# Patient Record
Sex: MALE | Race: BLACK OR AFRICAN AMERICAN | NOT HISPANIC OR LATINO | Employment: FULL TIME | URBAN - METROPOLITAN AREA
[De-identification: names, ages, dates, MRNs, and addresses within clinical notes are randomized per-mention and may not be internally consistent; named-entity substitution may affect disease eponyms.]

---

## 2022-02-17 ENCOUNTER — PATIENT ROUNDING (BHMG ONLY) (OUTPATIENT)
Dept: FAMILY MEDICINE CLINIC | Facility: CLINIC | Age: 47
End: 2022-02-17

## 2022-02-17 ENCOUNTER — OFFICE VISIT (OUTPATIENT)
Dept: FAMILY MEDICINE CLINIC | Facility: CLINIC | Age: 47
End: 2022-02-17

## 2022-02-17 VITALS
DIASTOLIC BLOOD PRESSURE: 88 MMHG | HEART RATE: 96 BPM | OXYGEN SATURATION: 99 % | HEIGHT: 66 IN | SYSTOLIC BLOOD PRESSURE: 126 MMHG | BODY MASS INDEX: 32.88 KG/M2 | WEIGHT: 204.6 LBS

## 2022-02-17 DIAGNOSIS — K21.9 GASTROESOPHAGEAL REFLUX DISEASE WITHOUT ESOPHAGITIS: ICD-10-CM

## 2022-02-17 DIAGNOSIS — F41.8 DEPRESSION WITH ANXIETY: Primary | ICD-10-CM

## 2022-02-17 DIAGNOSIS — R07.89 OTHER CHEST PAIN: ICD-10-CM

## 2022-02-17 DIAGNOSIS — S29.011A PECTORALIS MUSCLE STRAIN, INITIAL ENCOUNTER: ICD-10-CM

## 2022-02-17 PROCEDURE — 99204 OFFICE O/P NEW MOD 45 MIN: CPT | Performed by: INTERNAL MEDICINE

## 2022-02-17 RX ORDER — ESCITALOPRAM OXALATE 10 MG/1
10 TABLET ORAL DAILY
Qty: 30 TABLET | Refills: 9 | Status: SHIPPED | OUTPATIENT
Start: 2022-02-17

## 2022-02-17 RX ORDER — FAMOTIDINE 40 MG/1
40 TABLET, FILM COATED ORAL DAILY
Qty: 30 TABLET | Refills: 9 | Status: SHIPPED | OUTPATIENT
Start: 2022-02-17

## 2022-02-17 RX ORDER — OMEPRAZOLE 40 MG/1
CAPSULE, DELAYED RELEASE ORAL
COMMUNITY
End: 2022-02-17

## 2022-02-17 NOTE — PATIENT INSTRUCTIONS
Pectoralis Major Tear Rehab  Ask your health care provider which exercises are safe for you. Do exercises exactly as told by your health care provider and adjust them as directed. It is normal to feel mild stretching, pulling, tightness, or discomfort as you do these exercises. Stop right away if you feel sudden pain or your pain gets worse. Do not begin these exercises until told by your health care provider.  Stretching and range-of-motion exercises  These exercises warm up your muscles and joints and improve the movement and flexibility of your shoulder. These exercises can also help to relieve pain, numbness, and tingling.  Pendulum  This is a shoulder exercise in which you let the injured arm dangle toward the floor and then swing it like a clock pendulum.  1. Stand near a wall or a surface that you can hold onto for balance.  2. Bend at the waist and let your left / right arm hang straight down. Use your other arm to keep your balance.  3. Relax your arm and shoulder muscles, and move your hips and your trunk so your left / right arm swings freely. Your arm should swing because of the motion of your body, not because you are using your arm or shoulder muscles.  4. Keep moving your hips and trunk so your arm swings in the following directions, as told by your health care provider:  ? Side to side.  ? Forward and backward.  ? In clockwise and counterclockwise circles.  5. Slowly return to the starting position.  Repeat __________ times. Complete this exercise __________ times a day.  Standing shoulder abduction, passive  In this exercise, the injured shoulder relaxes (passive) while you use the healthy arm to push it away from your body (abduction).  1. Stand and hold a broomstick, a cane, or a similar object. Place your hands a little more than shoulder width apart on the object. Your left / right hand should be palm-up, and your other hand should be palm-down.  2. While keeping your elbow straight and your  shoulder muscles relaxed, push the stick across your body toward your left / right side. Raise your left / right arm to the side of your body and then over your head until you feel a stretch in your shoulder.  ? Stop when you reach the angle that is recommended by your health care provider.  ? Avoid shrugging your shoulder while you raise your arm. Keep your shoulder blade tucked down toward the middle of your spine.  3. Hold for __________ seconds.  4. Slowly return to the starting position.  Repeat __________ times. Complete this exercise __________ times a day.  Supine wand shoulder flexion, passive  In this exercise, the injured shoulder relaxes (passive) while you use the healthy arm to move it (flexion).  1. Lie on your back (supine position). You may bend your knees for comfort.  2. Hold a broomstick, a cane, or a similar object so that your hands are about shoulder width apart on the object. Your palms should face toward your feet.  3. Raise your left / right arm in front of your face, then behind your head (toward the floor). Use your other hand to help you do this. Stop when you feel a gentle stretch in your shoulder, or when you reach the angle that is recommended by your health care provider.  4. Hold for __________ seconds.  5. Use the stick and your other arm to help you return your left / right arm to the starting position.  Repeat __________ times. Complete this exercise __________ times a day.  Wand shoulder external rotation, passive  In this exercise, the injured shoulder relaxes (passive) while you use the healthy arm to push it away to your side (external rotation).  1. Stand and hold a broomstick, a cane, or a similar object so your hands are about shoulder width apart on the object.  2. Start with your arms hanging down, then bend both elbows to a 90-degree angle (right angle).  3. Keep your left / right elbow at your side. Use your other hand to push the stick so your left / right forearm  moves away from your body, out to your side.  ? Keep your left / right elbow bent to 90 degrees and keep it against your side.  ? Stop when you feel a gentle stretch in your shoulder, or when you reach the angle recommended by your health care provider.  4. Hold for __________ seconds.  5. Use the stick to help you return your left / right arm to the starting position.  Repeat __________ times. Complete this exercise __________ times a day.  Strengthening exercises  These exercises build strength and endurance in your shoulder. Endurance is the ability to use your muscles for a long time, even after your muscles get tired.  Scapular protraction, standing    1. Stand so you are facing a wall. Place your feet about one arm-length away from the wall.  2. Place your hands on the wall and straighten your elbows.  3. Keep your hands on the wall as you push your upper back away from the wall. You should feel your shoulder blades (scapulae) sliding forward (protraction) around your rib cage. Keep your elbows and your head still.  ? If you are not sure that you are doing this exercise correctly, ask your health care provider for more instructions.  4. Hold for __________ seconds.  5. Slowly return to the starting position. Let your muscles relax completely before you repeat this exercise.  Repeat __________ times. Complete this exercise __________ times a day.  Scapular retraction, seated  This exercise is also called shoulder blade squeezes.  1. Sit with good posture in a stable chair without armrests. Do not let your back touch the back of the chair.  2. Your arms should be at your sides with your elbows bent. You may rest your forearms on a pillow if that is more comfortable.  3. Squeeze your shoulder blades (scapulae) together. Bring them down and back (retraction).  ? Keep your shoulders level.  ? Do not lift your shoulders up toward your ears.  4. Hold for __________ seconds.  5. Return to the starting  position.  Repeat __________ times. Complete this exercise __________ times a day.  This information is not intended to replace advice given to you by your health care provider. Make sure you discuss any questions you have with your health care provider.  Document Revised: 04/09/2020 Document Reviewed: 12/16/2019  Elsevier Patient Education © 2021 [employer]

## 2022-02-17 NOTE — ASSESSMENT & PLAN NOTE
Tylenol as needed.  Ice 20 minutes on 20 minutes off exercises provided.  If symptoms persist, physical therapy referral

## 2022-02-17 NOTE — ASSESSMENT & PLAN NOTE
Patient's depression is single episode and is mild without psychosis. Their depression is currently active and the condition is newly identified. This will be reassessed at the next regular appointment. F/U as described:patient was prescribed an antidepressant medicine.  Lexapro 10 mg daily.  Side effects discussed.  Refer to behavioral health specialist.

## 2022-02-17 NOTE — PROGRESS NOTES
River Crowell  1975  [id number]  Patient Care Team:  Dallas Parra MD as PCP - General (Internal Medicine)    River Crowell is a 46 y.o. male here today to establish care.  This patient is accompanied by their self who contributes to the history of their care.    Chief Complaint:    Chief Complaint   Patient presents with   • Establish Care   • Chest Pain     Lt side pain, Muscle pain   • Anxiety   • Depression         History of Present Illness:    new, reports one month hx chest discomfort 1-2 x per week. Typically at rest. Feels cramping/pressure in left anterior chest. Typically can last 3 hrs. Eases with tylenol. Pain does not radiate. Not associated with SOA.  Triggers include chain smoking/or beer drinking. Reports daily heartburn. Stopped taking prilosec, but he stopped this 2/2 to apnea. Eats tums with improvement. Has not tried tums with chest pain. ( had egd and colonoscopy 3 years ago- he thinks [facility]). There is no associated nausea, no  Vomiting.     Smokes 1/2ppd. Unaware of his lipid status.    Struggling with confidence, felling down. Denies HI/SI    Past Medical History:   Diagnosis Date   • Anxiety    • Depression        Past Surgical History:   Procedure Laterality Date   • ANKLE SURGERY     • HERNIA REPAIR          Family History   Problem Relation Age of Onset   • Hypertension Mother    • Diabetes Mother    • Anxiety disorder Father    • Depression Father    • Colon cancer Father    • Diabetes Father    • Other Father         parkinson   • No Known Problems Brother    • No Known Problems Maternal Grandmother    • Colon cancer Maternal Grandfather         nph   • Colon cancer Paternal Grandfather        Social History     Socioeconomic History   • Marital status:    Tobacco Use   • Smoking status: Current Every Day Smoker     Packs/day: 0.50     Types: Cigarettes     Start date: 1994   • Smokeless tobacco: Never Used   Vaping Use   • Vaping Use: Never used  "  Substance and Sexual Activity   • Alcohol use: Yes     Comment: social   • Drug use: Never   • Sexual activity: Defer       Allergies   Allergen Reactions   • Morphine Other (See Comments)       Review of Systems:    Review of Systems   Constitutional: Positive for fatigue. Negative for unexpected weight gain and unexpected weight loss.   HENT:        Snore   Eyes: Negative.    Respiratory: Negative for shortness of breath and wheezing.    Cardiovascular: Positive for chest pain and palpitations.        Palpitations awakening him, nightly   Gastrointestinal: Positive for blood in stool, GERD and indigestion. Negative for nausea and vomiting.        Occasional blood in stool.    Endocrine: Negative for cold intolerance and heat intolerance.   Genitourinary: Negative.    Musculoskeletal: Negative.    Skin: Negative.    Neurological: Negative.    Psychiatric/Behavioral: Positive for sleep disturbance and depressed mood. The patient is nervous/anxious.        Vitals:    02/17/22 1321   BP: 126/88   Pulse: 96   SpO2: 99%   Weight: 92.8 kg (204 lb 9.6 oz)   Height: 167.6 cm (66\")   PainSc:   6   PainLoc: Chest     Body mass index is 33.02 kg/m².      Current Outpatient Medications:   •  escitalopram (Lexapro) 10 MG tablet, Take 1 tablet by mouth Daily., Disp: 30 tablet, Rfl: 9  •  famotidine (Pepcid) 40 MG tablet, Take 1 tablet by mouth Daily., Disp: 30 tablet, Rfl: 9    Physical Exam:    Physical Exam  Vitals and nursing note reviewed.   Constitutional:       General: He is not in acute distress.     Appearance: He is well-developed. He is not diaphoretic.   HENT:      Head: Normocephalic and atraumatic.      Right Ear: Tympanic membrane and external ear normal.      Left Ear: Tympanic membrane and external ear normal.      Mouth/Throat:      Pharynx: No oropharyngeal exudate.   Eyes:      General: No scleral icterus.        Right eye: No discharge.         Left eye: No discharge.      Extraocular Movements: " Extraocular movements intact.      Conjunctiva/sclera: Conjunctivae normal.      Pupils: Pupils are equal, round, and reactive to light.   Neck:      Thyroid: No thyromegaly.      Vascular: No JVD.      Trachea: No tracheal deviation.   Cardiovascular:      Rate and Rhythm: Normal rate and regular rhythm.      Pulses: Normal pulses.      Heart sounds: Normal heart sounds. No murmur heard.       Comments: PMI nondisplaced.  He has reproducible pectoral tenderness which mimics his symptoms.  Pulmonary:      Effort: Pulmonary effort is normal.      Breath sounds: Normal breath sounds. No wheezing or rales.   Abdominal:      General: Bowel sounds are normal.      Palpations: Abdomen is soft.      Tenderness: There is no abdominal tenderness. There is no guarding or rebound.   Musculoskeletal:      Cervical back: Normal range of motion and neck supple.      Comments: Normal gait   Lymphadenopathy:      Cervical: No cervical adenopathy.   Skin:     General: Skin is warm and dry.      Capillary Refill: Capillary refill takes less than 2 seconds.      Coloration: Skin is not pale.      Findings: No rash.   Neurological:      Mental Status: He is alert and oriented to person, place, and time.      Motor: No abnormal muscle tone.      Coordination: Coordination normal.   Psychiatric:         Mood and Affect: Mood normal.         Behavior: Behavior normal.         Judgment: Judgment normal.         Procedures    Results Review:    None    Assessment/Plan:     Problem List Items Addressed This Visit        Cardiac and Vasculature    Other chest pain       Gastrointestinal Abdominal     Gastroesophageal reflux disease without esophagitis    Current Assessment & Plan         Modifications addressed, avoid omeprazole with history of apnea while on this.  Recommended Pepcid 40 mg daily.         Relevant Medications    famotidine (Pepcid) 40 MG tablet       Mental Health    Depression with anxiety - Primary    Current Assessment &  Plan     Patient's depression is single episode and is mild without psychosis. Their depression is currently active and the condition is newly identified. This will be reassessed at the next regular appointment. F/U as described:patient was prescribed an antidepressant medicine.  Lexapro 10 mg daily.  Side effects discussed.  Refer to behavioral health specialist.         Relevant Medications    escitalopram (Lexapro) 10 MG tablet    Other Relevant Orders    Ambulatory Referral to Behavioral Health       Musculoskeletal and Injuries    Pectoralis muscle strain    Current Assessment & Plan     Tylenol as needed.  Ice 20 minutes on 20 minutes off exercises provided.  If symptoms persist, physical therapy referral               Plan of care reviewed with patient at the conclusion of today's visit. Education was provided regarding diagnosis and management.  Patient verbalizes understanding of and agreement with management plan.    Return in about 6 weeks (around 3/31/2022) for Annual.    Dallas Parra MD      Please note than portions of this note were completed wt a Voice Recognition Program

## 2022-02-25 ENCOUNTER — OFFICE VISIT (OUTPATIENT)
Dept: FAMILY MEDICINE CLINIC | Facility: CLINIC | Age: 47
End: 2022-02-25

## 2022-02-25 ENCOUNTER — TELEPHONE (OUTPATIENT)
Dept: FAMILY MEDICINE CLINIC | Facility: CLINIC | Age: 47
End: 2022-02-25

## 2022-02-25 ENCOUNTER — LAB (OUTPATIENT)
Dept: LAB | Facility: HOSPITAL | Age: 47
End: 2022-02-25

## 2022-02-25 VITALS
HEART RATE: 77 BPM | HEIGHT: 66 IN | SYSTOLIC BLOOD PRESSURE: 141 MMHG | BODY MASS INDEX: 32.92 KG/M2 | DIASTOLIC BLOOD PRESSURE: 90 MMHG | TEMPERATURE: 97.5 F | WEIGHT: 204.8 LBS | OXYGEN SATURATION: 98 %

## 2022-02-25 DIAGNOSIS — Z11.59 ENCOUNTER FOR HEPATITIS C SCREENING TEST FOR LOW RISK PATIENT: ICD-10-CM

## 2022-02-25 DIAGNOSIS — Z13.29 THYROID DISORDER SCREENING: ICD-10-CM

## 2022-02-25 DIAGNOSIS — F41.8 DEPRESSION WITH ANXIETY: ICD-10-CM

## 2022-02-25 DIAGNOSIS — E78.5 DYSLIPIDEMIA: Primary | ICD-10-CM

## 2022-02-25 DIAGNOSIS — Z23 IMMUNIZATION DUE: ICD-10-CM

## 2022-02-25 DIAGNOSIS — Z00.00 ANNUAL PHYSICAL EXAM: ICD-10-CM

## 2022-02-25 DIAGNOSIS — R07.89 OTHER CHEST PAIN: Primary | ICD-10-CM

## 2022-02-25 DIAGNOSIS — D72.829 LEUKOCYTOSIS, UNSPECIFIED TYPE: ICD-10-CM

## 2022-02-25 DIAGNOSIS — S29.011A PECTORALIS MUSCLE STRAIN, INITIAL ENCOUNTER: ICD-10-CM

## 2022-02-25 DIAGNOSIS — K21.9 GASTROESOPHAGEAL REFLUX DISEASE WITHOUT ESOPHAGITIS: ICD-10-CM

## 2022-02-25 LAB
ALBUMIN SERPL-MCNC: 4.7 G/DL (ref 3.5–5.2)
ALBUMIN/GLOB SERPL: 1.7 G/DL
ALP SERPL-CCNC: 63 U/L (ref 39–117)
ALT SERPL W P-5'-P-CCNC: 35 U/L (ref 1–41)
ANION GAP SERPL CALCULATED.3IONS-SCNC: 9.9 MMOL/L (ref 5–15)
AST SERPL-CCNC: 20 U/L (ref 1–40)
BASOPHILS # BLD AUTO: 0.03 10*3/MM3 (ref 0–0.2)
BASOPHILS NFR BLD AUTO: 0.3 % (ref 0–1.5)
BILIRUB SERPL-MCNC: 0.4 MG/DL (ref 0–1.2)
BUN SERPL-MCNC: 14 MG/DL (ref 6–20)
BUN/CREAT SERPL: 12.4 (ref 7–25)
CALCIUM SPEC-SCNC: 9.7 MG/DL (ref 8.6–10.5)
CHLORIDE SERPL-SCNC: 105 MMOL/L (ref 98–107)
CHOLEST SERPL-MCNC: 242 MG/DL (ref 0–200)
CO2 SERPL-SCNC: 25.1 MMOL/L (ref 22–29)
CREAT SERPL-MCNC: 1.13 MG/DL (ref 0.76–1.27)
DEPRECATED RDW RBC AUTO: 42.8 FL (ref 37–54)
EOSINOPHIL # BLD AUTO: 0.18 10*3/MM3 (ref 0–0.4)
EOSINOPHIL NFR BLD AUTO: 1.6 % (ref 0.3–6.2)
ERYTHROCYTE [DISTWIDTH] IN BLOOD BY AUTOMATED COUNT: 17.4 % (ref 12.3–15.4)
GFR SERPL CREATININE-BSD FRML MDRD: 85 ML/MIN/1.73
GLOBULIN UR ELPH-MCNC: 2.7 GM/DL
GLUCOSE SERPL-MCNC: 87 MG/DL (ref 65–99)
HCT VFR BLD AUTO: 42 % (ref 37.5–51)
HCV AB SER DONR QL: NORMAL
HDLC SERPL-MCNC: 40 MG/DL (ref 40–60)
HGB BLD-MCNC: 13.4 G/DL (ref 13–17.7)
IMM GRANULOCYTES # BLD AUTO: 0.05 10*3/MM3 (ref 0–0.05)
IMM GRANULOCYTES NFR BLD AUTO: 0.4 % (ref 0–0.5)
LDLC SERPL CALC-MCNC: 186 MG/DL (ref 0–100)
LDLC/HDLC SERPL: 4.59 {RATIO}
LYMPHOCYTES # BLD AUTO: 2.92 10*3/MM3 (ref 0.7–3.1)
LYMPHOCYTES NFR BLD AUTO: 25.5 % (ref 19.6–45.3)
MCH RBC QN AUTO: 24 PG (ref 26.6–33)
MCHC RBC AUTO-ENTMCNC: 31.9 G/DL (ref 31.5–35.7)
MCV RBC AUTO: 75.1 FL (ref 79–97)
MONOCYTES # BLD AUTO: 1.14 10*3/MM3 (ref 0.1–0.9)
MONOCYTES NFR BLD AUTO: 10 % (ref 5–12)
NEUTROPHILS NFR BLD AUTO: 62.2 % (ref 42.7–76)
NEUTROPHILS NFR BLD AUTO: 7.12 10*3/MM3 (ref 1.7–7)
NRBC BLD AUTO-RTO: 0 /100 WBC (ref 0–0.2)
PLATELET # BLD AUTO: 270 10*3/MM3 (ref 140–450)
PMV BLD AUTO: 10.1 FL (ref 6–12)
POTASSIUM SERPL-SCNC: 4.5 MMOL/L (ref 3.5–5.2)
PROT SERPL-MCNC: 7.4 G/DL (ref 6–8.5)
RBC # BLD AUTO: 5.59 10*6/MM3 (ref 4.14–5.8)
SODIUM SERPL-SCNC: 140 MMOL/L (ref 136–145)
TRIGL SERPL-MCNC: 93 MG/DL (ref 0–150)
TSH SERPL DL<=0.05 MIU/L-ACNC: 0.84 UIU/ML (ref 0.27–4.2)
VLDLC SERPL-MCNC: 16 MG/DL (ref 5–40)
WBC NRBC COR # BLD: 11.44 10*3/MM3 (ref 3.4–10.8)

## 2022-02-25 PROCEDURE — 86803 HEPATITIS C AB TEST: CPT

## 2022-02-25 PROCEDURE — 90471 IMMUNIZATION ADMIN: CPT | Performed by: INTERNAL MEDICINE

## 2022-02-25 PROCEDURE — 80061 LIPID PANEL: CPT

## 2022-02-25 PROCEDURE — 91305 COVID-19 (PFIZER) 12+ YRS: CPT | Performed by: INTERNAL MEDICINE

## 2022-02-25 PROCEDURE — 80053 COMPREHEN METABOLIC PANEL: CPT

## 2022-02-25 PROCEDURE — 99396 PREV VISIT EST AGE 40-64: CPT | Performed by: INTERNAL MEDICINE

## 2022-02-25 PROCEDURE — 0051A COVID-19 (PFIZER) 12+ YRS: CPT | Performed by: INTERNAL MEDICINE

## 2022-02-25 PROCEDURE — 84443 ASSAY THYROID STIM HORMONE: CPT

## 2022-02-25 PROCEDURE — 85025 COMPLETE CBC W/AUTO DIFF WBC: CPT

## 2022-02-25 PROCEDURE — 90732 PPSV23 VACC 2 YRS+ SUBQ/IM: CPT | Performed by: INTERNAL MEDICINE

## 2022-02-25 RX ORDER — ROSUVASTATIN CALCIUM 20 MG/1
20 TABLET, COATED ORAL DAILY
Qty: 90 TABLET | Refills: 3 | Status: SHIPPED | OUTPATIENT
Start: 2022-02-25

## 2022-02-25 NOTE — TELEPHONE ENCOUNTER
----- Message from Dallas Parra MD sent at 2/25/2022  3:39 PM EST -----  White blood cell count minimally elevated.  He had no symptoms and his physical exam was unremarkable.  This may be a stress response.  If he develops any fevers chills abdominal pain nausea vomiting diarrhea or urinary symptoms please bring it to our attention.  Otherwise I would repeat his CBC in about 8 to 12 weeks.    His cholesterol is significantly elevated with an LDL of 186.  I think this is mainly a genetic component.  I am not sure and doubt that he will be able to get this down with diet.  I would recommend Crestor 20 mg daily.  Would repeat cholesterol in 8 to 12 weeks.  I will send in a prescription          Attempted to contact patient, no answer. TARANM with office # given.

## 2022-02-25 NOTE — PROGRESS NOTES
River Crowell  1975  [id number]  Patient Care Team:  Dallas Parra MD as PCP - General (Internal Medicine)    River Crowell is a 46 y.o. male who is here today for his annual physical   This patient is accompanied by his self who contributes to the history of his care.    Chief Complaint:    Chief Complaint   Patient presents with   • Annual Exam     Yearly   • Depression     Follow up   • Anxiety     Follow up   • Heartburn     Follow up         History of Present Illness:   Last visit this gentleman was seen for chest pain found to have a pectoral strain as well as reflux.  He was started on Lexapro for anxiety. 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 Chest pain and kady improved. Safety measures discussed, will check with lanlorad as no smoke detectors in residence. Needs to see dentist and optho smoking about half pack per day would like to quit.  Does not sound quite tight.  His triggers are currently in the morning, as well as drinking.    Past Medical History:   Diagnosis Date   • Anxiety    • Depression        Past Surgical History:   Procedure Laterality Date   • ANKLE SURGERY     • HERNIA REPAIR          Family History   Problem Relation Age of Onset   • Hypertension Mother    • Diabetes Mother    • Anxiety disorder Father    • Depression Father    • Colon cancer Father    • Diabetes Father    • Other Father         parkinson   • No Known Problems Brother    • No Known Problems Maternal Grandmother    • Colon cancer Maternal Grandfather         nph   • Colon cancer Paternal Grandfather        Social History     Socioeconomic History   • Marital status:    Tobacco Use   • Smoking status: Current Every Day Smoker     Packs/day: 0.50     Years: 15.00     Pack years: 7.50     Types: Cigarettes     Start date: 1994   • Smokeless tobacco: Never Used   Vaping Use   • Vaping Use: Never used   Substance and Sexual Activity   • Alcohol use: Yes     Comment: social   • Drug use: Never  "  • Sexual activity: Defer       Allergies   Allergen Reactions   • Morphine Other (See Comments)       Depression: PHQ-2 Depression Screening  Little interest or pleasure in doing things?  0   Feeling down, depressed, or hopeless? 0   PHQ-2 Total Score 0      Immunization History   Administered Date(s) Administered   • COVID-19 (PFIZER) PURPLE CAP 08/20/2021, 09/13/2021       Review of Systems:    Review of Systems   Constitutional: Negative for chills, fatigue, fever, unexpected weight gain and unexpected weight loss.   HENT: Negative for ear pain, postnasal drip, sinus pressure and sore throat.    Eyes: Negative for blurred vision, double vision and visual disturbance.   Respiratory: Negative for cough, shortness of breath and wheezing.    Cardiovascular: Negative for chest pain, palpitations and leg swelling.   Gastrointestinal: Negative for abdominal pain, blood in stool, diarrhea, nausea and vomiting.   Endocrine: Negative for cold intolerance, heat intolerance, polydipsia, polyphagia and polyuria.   Genitourinary: Negative for dysuria, flank pain and hematuria.        Anorgasmia   Musculoskeletal: Negative for arthralgias and joint swelling.   Skin: Negative for dry skin and rash.   Neurological: Negative for weakness, numbness and headache.   Psychiatric/Behavioral: Negative for self-injury, suicidal ideas and depressed mood.       Vitals:    02/25/22 0831   BP: 141/90   Pulse: 77   Temp: 97.5 °F (36.4 °C)   SpO2: 98%   Weight: 92.9 kg (204 lb 12.8 oz)   Height: 167.6 cm (65.98\")     Body mass index is 33.07 kg/m².      Current Outpatient Medications:   •  escitalopram (Lexapro) 10 MG tablet, Take 1 tablet by mouth Daily., Disp: 30 tablet, Rfl: 9  •  famotidine (Pepcid) 40 MG tablet, Take 1 tablet by mouth Daily., Disp: 30 tablet, Rfl: 9    Physical Exam:    Physical Exam  Vitals and nursing note reviewed.   Constitutional:       General: He is not in acute distress.     Appearance: He is well-developed. He " is not diaphoretic.   HENT:      Head: Normocephalic and atraumatic.      Right Ear: External ear normal.      Left Ear: External ear normal.      Mouth/Throat:      Pharynx: No oropharyngeal exudate.   Eyes:      General: No scleral icterus.        Right eye: No discharge.      Conjunctiva/sclera: Conjunctivae normal.   Neck:      Thyroid: No thyromegaly.      Vascular: No JVD.      Trachea: No tracheal deviation.   Cardiovascular:      Rate and Rhythm: Normal rate and regular rhythm.      Heart sounds: Normal heart sounds.      Comments: PMI nondisplaced  Pulmonary:      Effort: Pulmonary effort is normal.      Breath sounds: Normal breath sounds. No wheezing or rales.   Abdominal:      General: Bowel sounds are normal.      Palpations: Abdomen is soft.      Tenderness: There is no abdominal tenderness. There is no guarding or rebound.   Musculoskeletal:      Cervical back: Normal range of motion and neck supple.      Comments: Normal gait   Lymphadenopathy:      Cervical: No cervical adenopathy.   Skin:     General: Skin is warm and dry.      Capillary Refill: Capillary refill takes less than 2 seconds.      Coloration: Skin is not pale.      Findings: No rash.   Neurological:      Mental Status: He is alert and oriented to person, place, and time.      Motor: No abnormal muscle tone.      Coordination: Coordination normal.   Psychiatric:         Judgment: Judgment normal.         Procedures    Results Review:    None    Assessment/Plan:     Problem List Items Addressed This Visit        Cardiac and Vasculature    Other chest pain - Primary       Gastrointestinal Abdominal     Gastroesophageal reflux disease without esophagitis    Relevant Medications    famotidine (Pepcid) 40 MG tablet       Mental Health    Depression with anxiety    Relevant Medications    escitalopram (Lexapro) 10 MG tablet       Musculoskeletal and Injuries    Pectoralis muscle strain      Other Visit Diagnoses     Annual physical exam         Relevant Orders    CBC & Differential    Comprehensive Metabolic Panel    Lipid Panel    TSH Rfx On Abnormal To Free T4    Encounter for hepatitis C screening test for low risk patient        Relevant Orders    Hepatitis C Antibody    Thyroid disorder screening        Relevant Orders    TSH Rfx On Abnormal To Free T4    Immunization due        Relevant Orders    COVID-19 Vaccine (Pfizer) Gray Cap          Plan of care was reviewed with patient at the conclusion of today's visit. Counseled patient with regards to good nutrition and diet. Maintaining a healthy lifestyle including exercise and physical activities. Spoke with patient on ways to reduce stress, getting adequate sleep and injury prevention.  Discussed prostate cancer screening, colon cancer screening including benefit of early detection and potential need for follow-up. Patient agrees to screenings today. Annual dental and eye exams were encouraged. Encouraged patient to continue to follow up with annual immunizations.     Continue with the Pepcid for the reflux continue conservative treatment for his pectoralis strain that is improved.  We have decided to decrease his Lexapro in half.  He will monitor his anorgasmia and if it still symptomatic I will consider changing to Wellbutrin.  This may help with his tobacco use as well.  We discussed measures on tobacco cessation such as gum, setting a start date taking history is out of his car and avoiding drinking.  Finally have strongly encouraged him to find a dentist.    Return in about 6 months (around 8/25/2022) for anxiety.    Dallas Parra MD      Please note than portions of this note were completed [employer] a Voice Recognition Program

## 2022-02-28 NOTE — TELEPHONE ENCOUNTER
Patient notified. He verbalized understanding and agreed to follow medical advice and start new medicine. He will return to lab in May for repeat testing.

## 2022-08-31 ENCOUNTER — HOSPITAL ENCOUNTER (EMERGENCY)
Facility: HOSPITAL | Age: 47
Discharge: HOME | End: 2022-08-31
Attending: EMERGENCY MEDICINE | Admitting: EMERGENCY MEDICINE

## 2022-08-31 ENCOUNTER — APPOINTMENT (OUTPATIENT)
Dept: GENERAL RADIOLOGY | Facility: HOSPITAL | Age: 47
End: 2022-08-31

## 2022-08-31 VITALS
SYSTOLIC BLOOD PRESSURE: 145 MMHG | HEART RATE: 68 BPM | OXYGEN SATURATION: 96 % | WEIGHT: 200 LBS | HEIGHT: 66 IN | RESPIRATION RATE: 17 BRPM | TEMPERATURE: 98.9 F | DIASTOLIC BLOOD PRESSURE: 106 MMHG | BODY MASS INDEX: 32.14 KG/M2

## 2022-08-31 DIAGNOSIS — R07.9 CHEST PAIN, UNSPECIFIED TYPE: Primary | ICD-10-CM

## 2022-08-31 LAB
ALBUMIN SERPL-MCNC: 4.2 G/DL (ref 3.5–5.2)
ALBUMIN/GLOB SERPL: 1.6 G/DL
ALP SERPL-CCNC: 60 U/L (ref 39–117)
ALT SERPL W P-5'-P-CCNC: 34 U/L (ref 1–41)
ANION GAP SERPL CALCULATED.3IONS-SCNC: 9 MMOL/L (ref 5–15)
AST SERPL-CCNC: 22 U/L (ref 1–40)
BASOPHILS # BLD AUTO: 0.02 10*3/MM3 (ref 0–0.2)
BASOPHILS NFR BLD AUTO: 0.2 % (ref 0–1.5)
BILIRUB SERPL-MCNC: 0.3 MG/DL (ref 0–1.2)
BUN SERPL-MCNC: 12 MG/DL (ref 6–20)
BUN/CREAT SERPL: 11.2 (ref 7–25)
CALCIUM SPEC-SCNC: 9.2 MG/DL (ref 8.6–10.5)
CHLORIDE SERPL-SCNC: 104 MMOL/L (ref 98–107)
CO2 SERPL-SCNC: 24 MMOL/L (ref 22–29)
CREAT SERPL-MCNC: 1.07 MG/DL (ref 0.76–1.27)
DEPRECATED RDW RBC AUTO: 43.2 FL (ref 37–54)
EGFRCR SERPLBLD CKD-EPI 2021: 86.7 ML/MIN/1.73
EOSINOPHIL # BLD AUTO: 0.07 10*3/MM3 (ref 0–0.4)
EOSINOPHIL NFR BLD AUTO: 0.6 % (ref 0.3–6.2)
ERYTHROCYTE [DISTWIDTH] IN BLOOD BY AUTOMATED COUNT: 16.1 % (ref 12.3–15.4)
GLOBULIN UR ELPH-MCNC: 2.6 GM/DL
GLUCOSE SERPL-MCNC: 97 MG/DL (ref 65–99)
HCT VFR BLD AUTO: 41.3 % (ref 37.5–51)
HGB BLD-MCNC: 13.6 G/DL (ref 13–17.7)
HOLD SPECIMEN: NORMAL
IMM GRANULOCYTES # BLD AUTO: 0.07 10*3/MM3 (ref 0–0.05)
IMM GRANULOCYTES NFR BLD AUTO: 0.6 % (ref 0–0.5)
LIPASE SERPL-CCNC: 23 U/L (ref 13–60)
LYMPHOCYTES # BLD AUTO: 2.63 10*3/MM3 (ref 0.7–3.1)
LYMPHOCYTES NFR BLD AUTO: 23.5 % (ref 19.6–45.3)
MCH RBC QN AUTO: 24.7 PG (ref 26.6–33)
MCHC RBC AUTO-ENTMCNC: 32.9 G/DL (ref 31.5–35.7)
MCV RBC AUTO: 75.1 FL (ref 79–97)
MONOCYTES # BLD AUTO: 0.74 10*3/MM3 (ref 0.1–0.9)
MONOCYTES NFR BLD AUTO: 6.6 % (ref 5–12)
NEUTROPHILS NFR BLD AUTO: 68.5 % (ref 42.7–76)
NEUTROPHILS NFR BLD AUTO: 7.64 10*3/MM3 (ref 1.7–7)
NRBC BLD AUTO-RTO: 0 /100 WBC (ref 0–0.2)
NT-PROBNP SERPL-MCNC: 21.2 PG/ML (ref 0–450)
PLATELET # BLD AUTO: 238 10*3/MM3 (ref 140–450)
PMV BLD AUTO: 9.4 FL (ref 6–12)
POTASSIUM SERPL-SCNC: 4 MMOL/L (ref 3.5–5.2)
PROT SERPL-MCNC: 6.8 G/DL (ref 6–8.5)
QT INTERVAL: 360 MS
QT INTERVAL: 386 MS
QTC INTERVAL: 407 MS
QTC INTERVAL: 428 MS
RBC # BLD AUTO: 5.5 10*6/MM3 (ref 4.14–5.8)
SODIUM SERPL-SCNC: 137 MMOL/L (ref 136–145)
TROPONIN T SERPL-MCNC: <0.01 NG/ML (ref 0–0.03)
TROPONIN T SERPL-MCNC: <0.01 NG/ML (ref 0–0.03)
WBC NRBC COR # BLD: 11.17 10*3/MM3 (ref 3.4–10.8)
WHOLE BLOOD HOLD COAG: NORMAL
WHOLE BLOOD HOLD SPECIMEN: NORMAL

## 2022-08-31 PROCEDURE — 25010000002 KETOROLAC TROMETHAMINE PER 15 MG: Performed by: EMERGENCY MEDICINE

## 2022-08-31 PROCEDURE — 71045 X-RAY EXAM CHEST 1 VIEW: CPT

## 2022-08-31 PROCEDURE — 83690 ASSAY OF LIPASE: CPT | Performed by: EMERGENCY MEDICINE

## 2022-08-31 PROCEDURE — 99284 EMERGENCY DEPT VISIT MOD MDM: CPT

## 2022-08-31 PROCEDURE — 84484 ASSAY OF TROPONIN QUANT: CPT | Performed by: EMERGENCY MEDICINE

## 2022-08-31 PROCEDURE — 93005 ELECTROCARDIOGRAM TRACING: CPT | Performed by: EMERGENCY MEDICINE

## 2022-08-31 PROCEDURE — 36415 COLL VENOUS BLD VENIPUNCTURE: CPT

## 2022-08-31 PROCEDURE — 83880 ASSAY OF NATRIURETIC PEPTIDE: CPT | Performed by: EMERGENCY MEDICINE

## 2022-08-31 PROCEDURE — 96374 THER/PROPH/DIAG INJ IV PUSH: CPT

## 2022-08-31 PROCEDURE — 80053 COMPREHEN METABOLIC PANEL: CPT | Performed by: EMERGENCY MEDICINE

## 2022-08-31 PROCEDURE — 85025 COMPLETE CBC W/AUTO DIFF WBC: CPT | Performed by: EMERGENCY MEDICINE

## 2022-08-31 RX ORDER — SODIUM CHLORIDE 0.9 % (FLUSH) 0.9 %
10 SYRINGE (ML) INJECTION AS NEEDED
Status: DISCONTINUED | OUTPATIENT
Start: 2022-08-31 | End: 2022-08-31 | Stop reason: HOSPADM

## 2022-08-31 RX ORDER — ASPIRIN 81 MG/1
324 TABLET, CHEWABLE ORAL ONCE
Status: COMPLETED | OUTPATIENT
Start: 2022-08-31 | End: 2022-08-31

## 2022-08-31 RX ORDER — KETOROLAC TROMETHAMINE 15 MG/ML
15 INJECTION, SOLUTION INTRAMUSCULAR; INTRAVENOUS ONCE
Status: COMPLETED | OUTPATIENT
Start: 2022-08-31 | End: 2022-08-31

## 2022-08-31 RX ADMIN — ASPIRIN 81 MG 324 MG: 81 TABLET ORAL at 10:29

## 2022-08-31 RX ADMIN — KETOROLAC TROMETHAMINE 15 MG: 15 INJECTION, SOLUTION INTRAMUSCULAR; INTRAVENOUS at 11:54

## 2022-09-01 NOTE — ED PROVIDER NOTES
"Subjective   46-year-old male presents for evaluation of chest pain.  He notes that for the past 2 weeks or so he has been experiencing intermittent episodes of central chest pain.  Occasionally, it will radiate toward his right chest.  He denies any accompanying nausea or vomiting.  No diaphoresis.  He denies any preceding trauma, injury, or fall.  He is unsure as to what might be causing his symptoms.  He currently rates his pain at 5 out of 10 in severity.  He describes the pain as a \"pressure.\"  He notes that the pain is positional and seems to be worse with movement and bending over.  He has cardiac risk factors of smoking and hyperlipidemia.          Review of Systems   Respiratory: Positive for chest tightness and shortness of breath.    Cardiovascular: Positive for chest pain.   All other systems reviewed and are negative.      Past Medical History:   Diagnosis Date   • Anxiety    • Depression        Allergies   Allergen Reactions   • Morphine Other (See Comments)       Past Surgical History:   Procedure Laterality Date   • ANKLE SURGERY     • HERNIA REPAIR         Family History   Problem Relation Age of Onset   • Hypertension Mother    • Diabetes Mother    • Anxiety disorder Father    • Depression Father    • Colon cancer Father    • Diabetes Father    • Other Father         parkinson   • No Known Problems Brother    • No Known Problems Maternal Grandmother    • Colon cancer Maternal Grandfather         nph   • Colon cancer Paternal Grandfather        Social History     Socioeconomic History   • Marital status:    Tobacco Use   • Smoking status: Current Every Day Smoker     Packs/day: 0.50     Years: 15.00     Pack years: 7.50     Types: Cigarettes     Start date: 1994   • Smokeless tobacco: Never Used   Vaping Use   • Vaping Use: Never used   Substance and Sexual Activity   • Alcohol use: Yes     Comment: social   • Drug use: Never   • Sexual activity: Defer           Objective   Physical " "Exam  Vitals and nursing note reviewed.   Constitutional:       General: He is not in acute distress.     Appearance: He is well-developed. He is not diaphoretic.      Comments: Nontoxic-appearing male   HENT:      Head: Normocephalic and atraumatic.   Neck:      Vascular: No JVD.   Cardiovascular:      Rate and Rhythm: Normal rate and regular rhythm.      Heart sounds: Normal heart sounds. No murmur heard.    No friction rub. No gallop.   Pulmonary:      Effort: Pulmonary effort is normal. No respiratory distress.      Breath sounds: Normal breath sounds. No wheezing or rales.   Abdominal:      General: Bowel sounds are normal. There is no distension.      Palpations: Abdomen is soft. There is no mass.      Tenderness: There is no abdominal tenderness. There is no guarding.   Musculoskeletal:         General: Normal range of motion.      Cervical back: Normal range of motion.      Right lower leg: No edema.      Left lower leg: No edema.   Skin:     General: Skin is warm and dry.      Coloration: Skin is not pale.      Findings: No erythema or rash.   Neurological:      General: No focal deficit present.      Mental Status: He is alert and oriented to person, place, and time.   Psychiatric:         Mood and Affect: Mood normal.         Thought Content: Thought content normal.         Judgment: Judgment normal.         Procedures           ED Course  ED Course as of 08/31/22 2104   Wed Aug 31, 2022   1120 46-year-old male presents for evaluation of chest pain for the past 2 weeks. [DD]   1121 On arrival to the ED, the patient is nontoxic-appearing.  Benign exam.  He has cardiac risk factors of smoking and hyperlipidemia.  He describes his pain as a \"pressure\" and currently rates it at 5 out of 10 in severity.  His pain is positional. [DD]   1121 Labs are bland.  Low risk Well's and PERC negative. [DD]   1121 I personally viewed the patient's x-ray images myself, and I am in agreement with the radiologist's reading " for final interpretation.     [DD]   1122 Initial EKG revealed normal sinus rhythm with a heart rate of 85 and no ST segments suggestive of or concerning for ischemia. [DD]   1122 HEART score of 2. [DD]   1259 Upon reevaluation, the patient looks and feels improved.  Repeat troponin/EKG negative/unchanged.  Doubt ACS, PE, dissection, or emergent cardiothoracic process at this time based on exam, history, clinical presentation, gestalt, objective findings in the ED, and risk stratification.  HEART score of 2.  The patient will follow up with the chest pain clinic within the next 72 hours for further outpatient work-up and evaluation.  Agreeable with plan and given appropriate strict return precautions.     [DD]      ED Course User Index  [DD] Arron Khoury MD                                          Recent Results (from the past 24 hour(s))   ECG 12 Lead    Collection Time: 08/31/22 10:25 AM   Result Value Ref Range    QT Interval 360 ms    QTC Interval 428 ms   Troponin    Collection Time: 08/31/22 10:28 AM    Specimen: Blood   Result Value Ref Range    Troponin T <0.010 0.000 - 0.030 ng/mL   Comprehensive Metabolic Panel    Collection Time: 08/31/22 10:28 AM    Specimen: Blood   Result Value Ref Range    Glucose 97 65 - 99 mg/dL    BUN 12 6 - 20 mg/dL    Creatinine 1.07 0.76 - 1.27 mg/dL    Sodium 137 136 - 145 mmol/L    Potassium 4.0 3.5 - 5.2 mmol/L    Chloride 104 98 - 107 mmol/L    CO2 24.0 22.0 - 29.0 mmol/L    Calcium 9.2 8.6 - 10.5 mg/dL    Total Protein 6.8 6.0 - 8.5 g/dL    Albumin 4.20 3.50 - 5.20 g/dL    ALT (SGPT) 34 1 - 41 U/L    AST (SGOT) 22 1 - 40 U/L    Alkaline Phosphatase 60 39 - 117 U/L    Total Bilirubin 0.3 0.0 - 1.2 mg/dL    Globulin 2.6 gm/dL    A/G Ratio 1.6 g/dL    BUN/Creatinine Ratio 11.2 7.0 - 25.0    Anion Gap 9.0 5.0 - 15.0 mmol/L    eGFR 86.7 >60.0 mL/min/1.73   Lipase    Collection Time: 08/31/22 10:28 AM    Specimen: Blood   Result Value Ref Range    Lipase 23 13 - 60 U/L    BNP    Collection Time: 08/31/22 10:28 AM    Specimen: Blood   Result Value Ref Range    proBNP 21.2 0.0 - 450.0 pg/mL   Green Top (Gel)    Collection Time: 08/31/22 10:28 AM   Result Value Ref Range    Extra Tube Hold for add-ons.    Lavender Top    Collection Time: 08/31/22 10:28 AM   Result Value Ref Range    Extra Tube hold for add-on    Gold Top - SST    Collection Time: 08/31/22 10:28 AM   Result Value Ref Range    Extra Tube Hold for add-ons.    Gray Top    Collection Time: 08/31/22 10:28 AM   Result Value Ref Range    Extra Tube Hold for add-ons.    Light Blue Top    Collection Time: 08/31/22 10:28 AM   Result Value Ref Range    Extra Tube Hold for add-ons.    CBC Auto Differential    Collection Time: 08/31/22 10:28 AM    Specimen: Blood   Result Value Ref Range    WBC 11.17 (H) 3.40 - 10.80 10*3/mm3    RBC 5.50 4.14 - 5.80 10*6/mm3    Hemoglobin 13.6 13.0 - 17.7 g/dL    Hematocrit 41.3 37.5 - 51.0 %    MCV 75.1 (L) 79.0 - 97.0 fL    MCH 24.7 (L) 26.6 - 33.0 pg    MCHC 32.9 31.5 - 35.7 g/dL    RDW 16.1 (H) 12.3 - 15.4 %    RDW-SD 43.2 37.0 - 54.0 fl    MPV 9.4 6.0 - 12.0 fL    Platelets 238 140 - 450 10*3/mm3    Neutrophil % 68.5 42.7 - 76.0 %    Lymphocyte % 23.5 19.6 - 45.3 %    Monocyte % 6.6 5.0 - 12.0 %    Eosinophil % 0.6 0.3 - 6.2 %    Basophil % 0.2 0.0 - 1.5 %    Immature Grans % 0.6 (H) 0.0 - 0.5 %    Neutrophils, Absolute 7.64 (H) 1.70 - 7.00 10*3/mm3    Lymphocytes, Absolute 2.63 0.70 - 3.10 10*3/mm3    Monocytes, Absolute 0.74 0.10 - 0.90 10*3/mm3    Eosinophils, Absolute 0.07 0.00 - 0.40 10*3/mm3    Basophils, Absolute 0.02 0.00 - 0.20 10*3/mm3    Immature Grans, Absolute 0.07 (H) 0.00 - 0.05 10*3/mm3    nRBC 0.0 0.0 - 0.2 /100 WBC   Troponin    Collection Time: 08/31/22 12:20 PM    Specimen: Blood   Result Value Ref Range    Troponin T <0.010 0.000 - 0.030 ng/mL   ECG 12 Lead    Collection Time: 08/31/22 12:22 PM   Result Value Ref Range    QT Interval 386 ms    QTC Interval 407 ms     Note:  "In addition to lab results from this visit, the labs listed above may include labs taken at another facility or during a different encounter within the last 24 hours. Please correlate lab times with ED admission and discharge times for further clarification of the services performed during this visit.    XR Chest 1 View   Final Result       1. No acute cardiopulmonary disease.           This report was finalized on 8/31/2022 11:10 AM by Forrest Garcia MD.            Vitals:    08/31/22 1010 08/31/22 1230   BP: (!) 147/110 (!) 145/106   BP Location: Left arm    Patient Position: Sitting    Pulse: 88 68   Resp: 17    Temp: 98.9 °F (37.2 °C)    TempSrc: Oral    SpO2: 97% 96%   Weight: 90.7 kg (200 lb)    Height: 167.6 cm (66\")      Medications   aspirin chewable tablet 324 mg (324 mg Oral Given 8/31/22 1029)   ketorolac (TORADOL) injection 15 mg (15 mg Intravenous Given 8/31/22 1154)     ECG/EMG Results (last 24 hours)     ** No results found for the last 24 hours. **        ECG 12 Lead   Final Result   Test Reason : chest pain   Blood Pressure :   */*   mmHG   Vent. Rate :  67 BPM     Atrial Rate :  67 BPM      P-R Int : 126 ms          QRS Dur :  92 ms       QT Int : 386 ms       P-R-T Axes :  34  57  25 degrees      QTc Int : 407 ms      Normal sinus rhythm   Normal ECG   When compared with ECG of 31-AUG-2022 10:25, (Unconfirmed)   No significant change was found   Confirmed by MD Khoury Michael ([id number]) on 8/31/2022 5:37:51 PM      Referred By: KAI           Confirmed By: Vikram Khoury MD      ECG 12 Lead   Final Result   Test Reason : chest pain   Blood Pressure :   */*   mmHG   Vent. Rate :  85 BPM     Atrial Rate :  85 BPM      P-R Int : 146 ms          QRS Dur :  86 ms       QT Int : 360 ms       P-R-T Axes :  49  43  28 degrees      QTc Int : 428 ms      Normal sinus rhythm   Normal ECG   When compared with ECG of 23-JUN-2015 03:38,   No significant change was found   Confirmed by MD Khoury Michael ([id number]) on " 8/31/2022 5:37:06 PM      Referred By: EDMD           Confirmed By: Vikram Khoury MD              [facility]    Final diagnoses:   Chest pain, unspecified type       ED Disposition  ED Disposition     ED Disposition   Discharge    Condition   Stable    Comment   --             [facility]  [address]  [facility] [address]  [phone]  In 3 days           Medication List      No changes were made to your prescriptions during this visit.          Arron Khoury MD  08/31/22 7287

## 2023-05-08 ENCOUNTER — TELEPHONE (OUTPATIENT)
Dept: FAMILY MEDICINE CLINIC | Facility: CLINIC | Age: 48
End: 2023-05-08
Payer: COMMERCIAL

## 2023-05-08 RX ORDER — ESCITALOPRAM OXALATE 10 MG/1
10 TABLET ORAL DAILY
Qty: 15 TABLET | Refills: 0 | Status: SHIPPED | OUTPATIENT
Start: 2023-05-08

## 2023-05-08 NOTE — TELEPHONE ENCOUNTER
Caller: River Crowell    Relationship: Self    Best call back number:      [phone]      Requested Prescriptions:     ANXIETY MEDICATION REFILL    PATIENT COULD NOT RECALL THE NAME OF THE MEDICATION     Pharmacy where request should be sent:     [id number] [place], [place] - [address] [facility] [address]  - [phone]  - [phone] FX     Last office visit with prescribing clinician: 2/25/2022   Last telemedicine visit with prescribing clinician: 6/17/2023   Next office visit with prescribing clinician: 6/17/2023     Additional details provided by patient:     PATIENT IS COMPLETELY OUT OF THE MEDICATION    Does the patient have less than a 3 day supply:  [x] Yes  [] No    Would you like a call back once the refill request has been completed: [] Yes [] No    If the office needs to give you a call back, can they leave a voicemail: [] Yes [] No    Alon Wu Rep   05/08/23 10:38 EDT     DR SHERMAN

## 2023-05-08 NOTE — TELEPHONE ENCOUNTER
Attempted to contact, no answer left message asking for return call to schedule upcoming annual exam    Patient has not been seen in the past year, needs an appt for further refills.   Rx Refill Note  Requested Prescriptions      No prescriptions requested or ordered in this encounter      Last office visit with prescribing clinician: 2/25/2022   Last telemedicine visit with prescribing clinician: 6/17/2023   Next office visit with prescribing clinician: 6/17/2023                         Would you like a call back once the refill request has been completed: [] Yes [] No    If the office needs to give you a call back, can they leave a voicemail: [] Yes [] No    Teresa Finney MA  05/08/23, 12:55 EDT

## 2023-06-16 PROBLEM — E78.5 DYSLIPIDEMIA: Status: ACTIVE | Noted: 2023-06-16

## 2023-06-17 ENCOUNTER — OFFICE VISIT (OUTPATIENT)
Dept: FAMILY MEDICINE CLINIC | Facility: CLINIC | Age: 48
End: 2023-06-17
Payer: COMMERCIAL

## 2023-06-17 VITALS
HEIGHT: 66 IN | SYSTOLIC BLOOD PRESSURE: 142 MMHG | HEART RATE: 106 BPM | OXYGEN SATURATION: 97 % | DIASTOLIC BLOOD PRESSURE: 92 MMHG | WEIGHT: 188 LBS | BODY MASS INDEX: 30.22 KG/M2

## 2023-06-17 DIAGNOSIS — E78.5 DYSLIPIDEMIA: Primary | ICD-10-CM

## 2023-06-17 DIAGNOSIS — Z71.6 ENCOUNTER FOR SMOKING CESSATION COUNSELING: ICD-10-CM

## 2023-06-17 DIAGNOSIS — Z91.89 AT RISK FOR APNEA: ICD-10-CM

## 2023-06-17 DIAGNOSIS — F17.219 CIGARETTE NICOTINE DEPENDENCE WITH NICOTINE-INDUCED DISORDER: ICD-10-CM

## 2023-06-17 DIAGNOSIS — K21.9 GASTROESOPHAGEAL REFLUX DISEASE WITHOUT ESOPHAGITIS: ICD-10-CM

## 2023-06-17 PROCEDURE — 99214 OFFICE O/P EST MOD 30 MIN: CPT | Performed by: INTERNAL MEDICINE

## 2023-06-17 RX ORDER — OMEPRAZOLE 40 MG/1
CAPSULE, DELAYED RELEASE ORAL EVERY 24 HOURS
COMMUNITY

## 2023-06-17 RX ORDER — VARENICLINE TARTRATE 1 MG/1
1 TABLET, FILM COATED ORAL 2 TIMES DAILY
Qty: 56 TABLET | Refills: 1 | Status: SHIPPED | OUTPATIENT
Start: 2023-07-15 | End: 2023-09-09

## 2023-06-17 NOTE — PROGRESS NOTES
"River Crowell  1975  [id number]  Patient Care Team:  Dallas Parra MD as PCP - General (Internal Medicine)    River Crowell is a 47 y.o. male here today for follow up.     This patient is accompanied by their self who contributes to the history of their care.    Chief Complaint:    Chief Complaint   Patient presents with    Sleep Apnea    Headache        History of Present Illness:  I have reviewed and/or updated the patient's past medical, past surgical, family, social history, problem list and allergies as appropriate.     Report daily fatigue, nonrestful slleep and headaches upon arising. He is a side sleeper. Father and brother with abril.. Fights napping, sleeps easily passenger in car, watching movies or reading. No history of nasal trauma    He also would like to quit smoking. He has tried gum, patches. Is willing to try chantix.    Unfortunately he stopped taking his rosuvastatin 20 mg daily.  Denied any side effects.    Review of Systems   Constitutional:  Positive for fatigue.   HENT:          Snoring   Respiratory:  Positive for apnea and cough.    Psychiatric/Behavioral:  Positive for sleep disturbance.      Vitals:    06/17/23 0950   BP: 142/92   Pulse: 106   SpO2: 97%   Weight: 85.3 kg (188 lb)   Height: 167.6 cm (65.98\")     Body mass index is 30.36 kg/m².    Physical Exam  Vitals and nursing note reviewed.   Constitutional:       General: He is not in acute distress.     Appearance: He is well-developed. He is not diaphoretic.   HENT:      Head: Normocephalic and atraumatic.      Right Ear: External ear normal.      Left Ear: External ear normal.      Mouth/Throat:      Pharynx: No oropharyngeal exudate.      Comments: /4 Mallampati airway  Eyes:      General: No scleral icterus.        Right eye: No discharge.      Conjunctiva/sclera: Conjunctivae normal.   Neck:      Thyroid: No thyromegaly.      Vascular: No JVD.      Trachea: No tracheal deviation.   Cardiovascular:      Rate and " Rhythm: Normal rate and regular rhythm.      Heart sounds: Normal heart sounds.      Comments: PMI nondisplaced  Pulmonary:      Effort: Pulmonary effort is normal.      Breath sounds: Normal breath sounds. No wheezing or rales.   Abdominal:      General: Bowel sounds are normal.      Palpations: Abdomen is soft.      Tenderness: There is no abdominal tenderness. There is no guarding or rebound.   Musculoskeletal:      Cervical back: Normal range of motion and neck supple.   Lymphadenopathy:      Cervical: No cervical adenopathy.   Skin:     General: Skin is warm and dry.      Capillary Refill: Capillary refill takes less than 2 seconds.      Coloration: Skin is not pale.      Findings: No rash.   Neurological:      Mental Status: He is alert and oriented to person, place, and time.      Motor: No abnormal muscle tone.      Coordination: Coordination normal.   Psychiatric:         Mood and Affect: Mood normal.         Behavior: Behavior normal.         Judgment: Judgment normal.       Procedures    Results Review:    None    Assessment/Plan:    Problem List Items Addressed This Visit          Advance Directives and General Issues    At risk for apnea    Relevant Orders    Ambulatory Referral to Sleep Medicine       Cardiac and Vasculature    Dyslipidemia - Primary    Relevant Orders    Lipid Panel    Comprehensive Metabolic Panel       Gastrointestinal Abdominal     Gastroesophageal reflux disease without esophagitis    Relevant Medications    omeprazole (priLOSEC) 40 MG capsule       Tobacco    Encounter for smoking cessation counseling    Relevant Orders    Lipid Panel    Comprehensive Metabolic Panel    TSH Rfx On Abnormal To Free T4    CBC (No Diff)    Cigarette nicotine dependence with nicotine-induced disorder    Relevant Medications    escitalopram (Lexapro) 10 MG tablet    Varenicline Tartrate, Starter, 0.5 MG X 11 & 1 MG X 42 tablet therapy pack    varenicline (Chantix Continuing Month He) 1 MG tablet  (Start on 7/15/2023)   River Crowell  reports that he has been smoking cigarettes. He started smoking about 29 years ago. He has a 7.50 pack-year smoking history. He has never used smokeless tobacco.. I have educated him on the risk of diseases from using tobacco products such as cancer, COPD, and heart disease.     I advised him to quit and he is willing to quit. We have discussed the following method/s for tobacco cessation:  Counseling Prescription Medicaiton.  Together we have set a quit date for 1 week from today.  He will follow up with me in 3 month or sooner to check on his progress.    I spent 5 minutes counseling the patient.         Plan of care reviewed with patient at the conclusion of today's visit. Education was provided regarding diagnosis and management.  Patient verbalizes understanding of and agreement with management plan.    Return in about 3 months (around 9/17/2023) for chantix/annual.    Dallas Parra MD      Please note than portions of this note were completed wth a Voice Recognition Program        Answers submitted by the patient for this visit:  Primary Reason for Visit (Submitted on 6/11/2023)  What is the primary reason for your visit?: Other  Other (Submitted on 6/11/2023)  Please describe your symptoms.: I’m always tired. I wake up with dry mouth  Have you had these symptoms before?: Yes  How long have you been having these symptoms?: Greater than 2 weeks  Please describe any probable cause for these symptoms. : Sleep apnea